# Patient Record
(demographics unavailable — no encounter records)

---

## 2020-01-01 NOTE — DS.PDOC
NICU Discharge Summary


General


Date of Birth


20


Date of Discharge


2020 at 02:50





Procedures During Visit


Endotracheal intubation performed  by Dr. Romo


Mechanical ventilation


Intratracheal surfactant instillation performed  by Dr. Romo


Umbilical artery catheterization performed  by Dr. Romo


Umbilical vein catheterization performed 2020 by Dr. Romo


Chest x-ray





History


This is a baby premature male, born at 32-5/7 weeks of gestational age via C-

section as the first of twins to a 27-year-old  (G) 2 para (P) now 2  

mother, who is blood type O positive, hepatitis B negative, rapid plasma reagin 

(RPR) negative, HIV negative, group B Streptococcus (GBS) unknown. Pregnancy was

complicated by intrauterine growth restriction with discordant growth. Rupture 

of membranes occurred at the time of delivery with clear fluid. Baby's Apgar 

scores at birth were 6 at one minute and 5 at five minutes and 7 at 10 minutes. 

Baby was admitted to the  Intensive Care Unit (NICU) due to prematurity,

low birthweight and respiratory distress..





Physical Examination


Measurements on Admission


On admission, the baby's weight is 1814 grams, length is  cm, and head 

circumference is  cm.


General:  Positive: Active, Other (exam consistent with gestational age of 32-

5/7 weeks); 


   Negative: Dysmorphic Features


HEENT:  Positive: Normocephalic, Anterior [place] Open


Heart:  Positive: S1,S2; 


   Negative: Murmur


Lungs:  Positive: Grunting and Retractions (moderate)


Abdomen:  Positive: Soft; 


   Negative: Distended


Male Genitalia:  Positive: Nl  Male Genitalia, Other (both testes 

undescended)


Skin:  Positive: Normal for Gestation, Normal Capillary Refill


Neurological:  POSITIVE: Good Tone





Summary


This child is a premature twin male delivered by  at 32-5/7 weeks' 

gestational age. He developed respiratory distress soon after delivery with 

grunting and retracting. We initially tried treating him with a combination of 

CPAP plus noninvasive pressure ventilation. He initially responded well but his 

work of breathing became more labored and his oxygen saturations decreased. He 

then required endotracheal intubation which I did with a 3.0 endotracheal tube 

on 2020. I also inserted an umbilical artery catheter to facilitate the 

obtaining of arterial blood gases and an umbilical vein catheter to provide 

reliable venous access. These procedures were also done on . We gave the 

child a 5 mL dose of surfactant through the endotracheal tube. After surfactant 

was instilled and mechanical ventilation was provided the child's respiratory 

status improved. He did require a fair amount of positive pressure to open his 

alveoli. His chest x-ray was very hazy indicating severe respiratory distress 

syndrome. I made arrangements for the child to be transferred to the [facility]  intensive care unit due to the severity of his respiratory 

distress syndrome. The child left [facility] early on the morning 

of 2020 in the care of the [facility] NICU transport team. I stayed 

with the child until the transport team arrived and gave report to the transport

team.











Amarjit Romo MD                  2020 19:53

## 2020-01-01 NOTE — NICUADMPD
NICU Admission Note


Date of Admission


2020 at 20:48





History


This is a baby premature male, born at 32-5/7 weeks of gestational age via C-

section as the first of twins to a 27-year-old  (G) 2 para (P) now 2  

mother, who is blood type O positive, hepatitis B negative, rapid plasma reagin 

(RPR) negative, HIV negative, group B Streptococcus (GBS) unknown. Pregnancy was

complicated by intrauterine growth restriction with discordant growth. Rupture 

of membranes occurred at the time of delivery with clear fluid. Baby's Apgar 

scores at birth were 6 at one minute and 5 at five minutes and 7 at 10 minutes. 

Baby was admitted to the  Intensive Care Unit (NICU) due to prematurity,

low birthweight and respiratory distress..





Physical Examination


Physical Measurements


On admission, the baby's weight is 1814 grams, length is  cm, and head 

circumference is  cm.


Vital Signs





Vital Signs








  Date Time  Temp Pulse Resp B/P (MAP) Pulse Ox O2 Delivery O2 Flow Rate FiO2


 


20 22:00     90   45


 


20 23:28      Ventilator  


 


20 23:28  184 85     








General:  Positive: Active, Other (exam consistent with gestational age of 32-

5/7 weeks); 


   Negative: Dysmorphic Features


HEENT:  Positive: Normocephalic, Anterior [place] Open


Heart:  Positive: S1,S2; 


   Negative: Murmur


Lungs:  Positive: Grunting and Retractions (moderate)


Abdomen:  Positive: Soft; 


   Negative: Distended


Male Genitalia:  Positive: Nl  Male Genitalia, Other (both testes 

undescended)


Skin:  Positive: Normal for Gestation, Normal Capillary Refill


Neurological:  POSITIVE: Good Tone





Assessment


Problems:  


(1) Respiratory distress syndrome


Problem Text:  This child developed grunting and retracting soon after delivery.

We provided initial respiratory support with CPAP plus NIPPV. The child 

initially responded well but he is breathing became more labored and his oxygen 

requirement increased. A chest x-ray was done which showed bilateral 

reticulogranularity indicative of severe respiratory distress syndrome. I 

intubated the child with a 3.0 endotracheal tube and gave him a 5 mL dose of 

surfactant. Mechanical ventilation was then provided. The child initially 

desaturated during the intubation process but recovered after positive pressure 

ventilation was provided. He did require a fairly high peak inspiratory 

pressure. His initial follow-up blood gas shows a pH of 7.304, PCO2 37.8, PO2 

209. We will wean his supplemental oxygen as indicated. I inserted an umbilical 

vein catheter to provide reliable venous access and an umbilical artery catheter

to facilitate the obtaining of arterial blood gases. The above procedures were 

done with the usual sterile conditions and were uncomplicated and well 

tolerated.





(2) Prematurity, 1,750-1,999 grams, 31-32 completed weeks


Problem Text:  This child was delivered at 32-5/7 weeks' gestational age with a 

birthweight of 1814 g. The child has relatively severe respiratory distress 

syndrome or requiring a high peak inspiratory pressure on his ventilator 

support. I have made arrangements for the child to be transferred to the [facility] NICU due to the severity of his respiratory distress syndrome.








Plan


1. Admission discussed with the NICU team.


2. Parents will be updated on condition and plan for the baby.











Amarjit Romo MD                  2020 00:32

## 2025-07-10 NOTE — REPVR
PROCEDURE INFORMATION: 

Exam: XR Abdomen, 1 View 

Exam date and time: 2020 10:40 PM 

Age: 0 days old 

Clinical indication: Other: Uvc placement 



TECHNIQUE: 

Imaging protocol: XR of the abdomen. 

Views: Frontal supine view of the abdomen. 1 View. 



COMPARISON: 

No relevant prior studies available. 



FINDINGS: 

Tubes, catheters and devices: Umbilical venous catheter is terminating at the 

inferior vena cava and atrial junction. 

Chest: Diffuse bilateral ground-glass opacities likely representing surfactant 

deficiency. 

Pleural spaces: Clear.

Cardiothymic shadow is unremarkable.

Gastrointestinal tract: No bowel dilatation or obstruction. No pneumatosis. 

Intraperitoneal space: No portal venous gas or free air. 

Bones/joints: Unremarkable. 



IMPRESSION: 

Umbilical venous catheter is terminating at the inferior vena cava and atrial 

junction. 

Diffuse bilateral ground-glass opacities likely representing surfactant 

deficiency. 



Electronically signed by: Silvia Eaton On 2020  23:39:46 PM
PROCEDURE INFORMATION: 

Exam: XR Chest, 1 View 

Exam date and time: 2020 12:58 AM 

Age: 1 days old 

Clinical indication: Other: Et tube placement; Additional info: Et tube and uac 

placement 



TECHNIQUE: 

Imaging protocol: XR of the chest. Pediatric exam. 

Views: 1 view. 



COMPARISON: 

No relevant prior studies available. 



FINDINGS: 

Tubes, catheters and devices: ET tube is terminating at the uma, this needs 

to be retracted back about 1.5 cm for optimal positioning. Umbilical venous 

catheter is now pulled back and terminating at the level of T10. Umbilical 

arterial catheter is terminating at the level of T12. 

Lungs: Diffuse bilateral ground-glass opacities slightly improved in the right 

lung likely surfactant deficiency. 

Pleural space: Unremarkable. No pleural effusion. No pneumothorax. 

Heart/Mediastinum: Unremarkable. Cardiothymic silhouette is within normal 

limits. Visualized airway is unremarkable. 

Bones/joints: Unremarkable. 



IMPRESSION: 

Diffuse bilateral ground-glass opacities slightly improved in the right lung 

likely surfactant deficiency. 



ET tube is terminating at the uma, this needs to be retracted back about 1.5 

cm for optimal positioning. Umbilical venous catheter is now pulled back and 

terminating at the level of T10. Umbilical arterial catheter is terminating at 

the level of T12. 



Electronically signed by: Silvia Eaton On 2020  01:27:28 AM
PROCEDURES:  Insertion of Supprelin LA subcutaneous implant 10-Jul-2025 09:48:23  Mayne, Riley